# Patient Record
Sex: MALE | Race: BLACK OR AFRICAN AMERICAN | Employment: FULL TIME | URBAN - METROPOLITAN AREA
[De-identification: names, ages, dates, MRNs, and addresses within clinical notes are randomized per-mention and may not be internally consistent; named-entity substitution may affect disease eponyms.]

---

## 2018-02-22 ENCOUNTER — OFFICE VISIT (OUTPATIENT)
Dept: NEUROLOGY | Age: 33
End: 2018-02-22

## 2018-02-22 VITALS
DIASTOLIC BLOOD PRESSURE: 70 MMHG | BODY MASS INDEX: 35.62 KG/M2 | HEIGHT: 72 IN | HEART RATE: 79 BPM | SYSTOLIC BLOOD PRESSURE: 118 MMHG | TEMPERATURE: 98.1 F | WEIGHT: 263 LBS

## 2018-02-22 DIAGNOSIS — I67.1 CEREBRAL ANEURYSM WITHOUT RUPTURE: Primary | ICD-10-CM

## 2018-02-22 PROBLEM — G70.00 OCULAR MYASTHENIA GRAVIS (HCC): Status: ACTIVE | Noted: 2018-02-22

## 2018-02-22 RX ORDER — ALBUTEROL SULFATE 90 UG/1
AEROSOL, METERED RESPIRATORY (INHALATION)
COMMUNITY
End: 2020-03-10

## 2018-02-22 NOTE — PROGRESS NOTES
Michael Quach is a 27 y.o. male presenting with Cerebral Aneurysm      HPI: Carmenza Murray is a 27year old right-handed male who was referred to the [address] [place] [address] by Dr. Kiran Hicks for cerebral aneurysm. 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 He had been having visual problems and left ptosis. Neuroimaging was performed. He had a MRI that was unrevealing but MRA brain on 1/23/18 reported focal 2-3 mm outpouching at the left supraclinoid ICA which could represent infundibulum from accessory left M1 segment versus small aneurysm. He doesn't think mestinon helps. Going to start IVIG in early March. He experiences vertical diplopia and photophobia. Has some left hand numbness at times and posterior forearm, it lasts a few minutes, has been going on for a few months or a year. Not sure if it is position-related. CT of chest showed thymic mass. He has upcoming follow-up with his PCP for this. He makes concrete pieces for bridges, etc for a living. He smokes a pack of cigarettes every two days or so but denies drug use. No known family history of cerebral aneurysm. Family history of CV disease, hypertension, renal disease, and DM. Review of Systems   Constitutional: Negative. HENT: Negative. Eyes: Positive for double vision, photophobia and pain (left). Respiratory: Negative. Cardiovascular: Negative. Gastrointestinal: Positive for vomiting. Genitourinary: Negative. Musculoskeletal: Negative. Skin: Negative. Neurological: Positive for tingling (left hand/ forearm). Endo/Heme/Allergies: Negative. Psychiatric/Behavioral: Negative.         Past Medical History:   Diagnosis Date    Asthma     Diplopia     Hypothyroidism due to Hashimoto's thyroiditis     Myasthenia gravis ([address]     Neurological disorder     Ocular myasthenia gravis ([address]     Vitamin B 12 deficiency        Past Surgical History:   Procedure Laterality Date    HX TONSIL AND ADENOIDECTOMY      HX TONSILLECTOMY         Allergies   Allergen Reactions    Bee Sting [Sting, Bee] Anaphylaxis       Current Outpatient Prescriptions   Medication Sig Dispense Refill    LEVOTHYROXINE SODIUM (SYNTHROID PO) Take 60 mg by mouth.  albuterol (PROVENTIL HFA, VENTOLIN HFA, PROAIR HFA) 90 mcg/actuation inhaler Take  by inhalation.  pyridostigmine (MESTINON) 60 mg tablet Take 60 mg by mouth two (2) times a day. Indications: MYASTHENIA GRAVIS         Social History     Social History    Marital status:      Spouse name: N/A    Number of children: N/A    Years of education: N/A     Occupational History    Not on file. Social History Main Topics    Smoking status: Current Every Day Smoker    Smokeless tobacco: Never Used      Comment: 2 cigars/day    Alcohol use Yes      Comment: rare vodka    Drug use: No    Sexual activity: Not on file     Other Topics Concern    Not on file     Social History Narrative       Family History   Problem Relation Age of Onset    Heart Disease Mother     Stroke Mother     Hypertension Father     Diabetes Father     Heart Disease Father     Asthma Brother        Vitals:    02/22/18 1049 02/22/18 1051   BP: 122/76 118/70   Pulse: 79 79   Temp: 98.1 °F (36.7 °C)    TempSrc: Oral    Weight: 119.3 kg (263 lb)    Height: 6' (1.829 m)         Neurologic Exam     Mental Status   Oriented to person, place, and time. Follows 3 step commands. Attention: normal. Concentration: normal.   Speech: speech is normal   Level of consciousness: alert  Knowledge: good. Normal comprehension. Cranial Nerves     CN II   Visual fields full to confrontation. CN III, IV, VI   Extraocular motions are normal.   Cranial nerve III palsy: left ptosis. Diplopia: bilateral and vertical  Ophthalmoparesis: none  Upgaze: normal  Downgaze: normal  Conjugate gaze: present    CN VII   Facial expression full, symmetric.      CN VIII   Hearing: intact    CN IX, X Palate: symmetric    CN XI   CN XI normal.     CN XII   Tongue deviation: none    Motor Exam     Strength   Strength 5/5 throughout. Sensory Exam   Light touch normal.     Gait, Coordination, and Reflexes     Tremor   Resting tremor: absent      Physical Exam   Constitutional: He is oriented to person, place, and time. He appears well-developed and well-nourished. HENT:   Head: Normocephalic and atraumatic. Eyes: EOM are normal.   Cardiovascular: Normal rate. Pulmonary/Chest: Effort normal.   Neurological: He is alert and oriented to person, place, and time. He has normal strength. Skin: Skin is warm and dry. Psychiatric: He has a normal mood and affect. His speech is normal and behavior is normal. Judgment and thought content normal.   Vitals reviewed. Data: Personal review of neuroimaging shows:  MRA 01/23/2018 LPCoA thin dilatation consistent with LAChA infundibulum vs very small aneurysm then accessory LMCA going to left inferior temporal branch. Assessment and Plan: This is a 27year old right-handed male with LPCoA dilatation consistent with infundibulum versus very small aneurysm. Cerebral aneurysms were discussed with the patient and his significant other with the aid of the cerebral aneurysm booklet. We discussed different methods for monitoring and treatment. The area is very small and it would be appropriate to monitor with surveillance studies at one year with MRA 3T. We discussed measures for blood vessel health including control of blood pressure with goal of systolic blood pressure 194-147 and lipid control. His blood pressure is controlled at his visit. Smoking cessation was counseled. He is interested in quitting or reducing. The s/s that should prompt the patient to call 911 and present to the ED were discussed. Total time 45 minutes with 30 minutes spent in counseling.

## 2018-02-22 NOTE — MR AVS SNAPSHOT
Susie Moss 
 
 
 [address] [address] [address] 
[phone] Patient: Melita Hopson. MRN: [id number] :1985 Visit Information Date & Time Provider Department Dept. Phone Encounter #  
 2018 10:00 AM Blossom Mcarthur, [address] [place] [address] [address] [id number] Follow-up Instructions Return for MRA 3T at one year (2019). Upcoming Health Maintenance Date Due Pneumococcal 19-64 Medium Risk (1 of 1 - PPSV23) 2002 Influenza Age 5 to Adult 2017 DTaP/Tdap/Td series (2 - Td) 2026 Allergies as of 2018  Review Complete On: 2018 By: Domenico Liao NP Severity Noted Reaction Type Reactions Bee Sting [Sting, Bee] High 2018    Anaphylaxis Current Immunizations  Never Reviewed Name Date Tdap 2016  3:55 AM  
  
 Not reviewed this visit You Were Diagnosed With   
  
 Codes Comments Cerebral aneurysm without rupture    -  Primary ICD-10-CM: I67.1 ICD-9-CM: 437.3 Vitals BP Pulse Temp Height(growth percentile) Weight(growth percentile) BMI  
 118/70 79 98.1 °F (36.7 °C) (Oral) 6' (1.829 m) 263 lb (119.3 kg) 35.67 kg/m2 Smoking Status Current Every Day Smoker Vitals History BMI and BSA Data Body Mass Index Body Surface Area  
 35.67 kg/m 2 2.46 m 2 Preferred Pharmacy Pharmacy Name Phone [address]  [address] [place] [address] [phone] Your Updated Medication List  
  
   
This list is accurate as of 18 12:17 PM.  Always use your most recent med list.  
  
  
  
  
 albuterol 90 mcg/actuation inhaler Commonly known as:  PROVENTIL HFA, VENTOLIN HFA, PROAIR HFA Take  by inhalation. MESTINON 60 mg tablet Generic drug:  pyridostigmine Take 60 mg by mouth two (2) times a day.  Indications: MYASTHENIA GRAVIS  
  
 SYNTHROID PO  
 Take 60 mg by mouth. Follow-up Instructions Return for MRA 3T at one year (Jan. 2019). Introducing [facility]! Michael Tenorio introduces [employer] patient portal. Now you can access parts of your medical record, email your doctor's office, and request medication refills online. 1. In your internet browser, go to [email or web address] 2. Click on the First Time User? Click Here link in the Sign In box. You will see the New Member Sign Up page. 3. Enter your [employer] Access Code exactly as it appears below. You will not need to use this code after youve completed the sign-up process. If you do not sign up before the expiration date, you must request a new code. · [employer] Access Code: [email or web address] Expires: 3/3/2018  2:07 PM 
 
4. Enter the last four digits of your Social Security Number (xxxx) and Date of Birth (mm/dd/yyyy) as indicated and click Submit. You will be taken to the next sign-up page. 5. Create a [employer] ID. This will be your [employer] login ID and cannot be changed, so think of one that is secure and easy to remember. 6. Create a [employer] password. You can change your password at any time. 7. Enter your Password Reset Question and Answer. This can be used at a later time if you forget your password. 8. Enter your e-mail address. You will receive e-mail notification when new information is available in [address] 9. Click Sign Up. You can now view and download portions of your medical record. 10. Click the Download Summary menu link to download a portable copy of your medical information. If you have questions, please visit the Frequently Asked Questions section of the [employer] website. Remember, [employer] is NOT to be used for urgent needs. For medical emergencies, dial 911. Now available from your iPhone and Android! Please provide this summary of care documentation to your next provider. Your primary care clinician is listed as Ramez Zurita. If you have any questions after today's visit, please call [phone].